# Patient Record
Sex: FEMALE | Race: WHITE | NOT HISPANIC OR LATINO | Employment: FULL TIME | URBAN - METROPOLITAN AREA
[De-identification: names, ages, dates, MRNs, and addresses within clinical notes are randomized per-mention and may not be internally consistent; named-entity substitution may affect disease eponyms.]

---

## 2024-04-04 ENCOUNTER — APPOINTMENT (OUTPATIENT)
Dept: PRIMARY CARE | Facility: CLINIC | Age: 59
End: 2024-04-04
Payer: COMMERCIAL

## 2024-04-05 ENCOUNTER — APPOINTMENT (OUTPATIENT)
Dept: PRIMARY CARE | Facility: CLINIC | Age: 59
End: 2024-04-05
Payer: COMMERCIAL

## 2024-04-12 ENCOUNTER — APPOINTMENT (OUTPATIENT)
Dept: PRIMARY CARE | Facility: CLINIC | Age: 59
End: 2024-04-12
Payer: COMMERCIAL

## 2024-08-16 ENCOUNTER — LAB (OUTPATIENT)
Dept: LAB | Facility: LAB | Age: 59
End: 2024-08-16
Payer: COMMERCIAL

## 2024-08-16 LAB — COTININE UR QL SCN: NEGATIVE

## 2024-08-19 ENCOUNTER — APPOINTMENT (OUTPATIENT)
Dept: OTOLARYNGOLOGY | Facility: CLINIC | Age: 59
End: 2024-08-19
Payer: COMMERCIAL

## 2025-07-14 ENCOUNTER — ANCILLARY PROCEDURE (OUTPATIENT)
Dept: URGENT CARE | Age: 60
End: 2025-07-14
Payer: COMMERCIAL

## 2025-07-14 ENCOUNTER — OFFICE VISIT (OUTPATIENT)
Dept: URGENT CARE | Age: 60
End: 2025-07-14
Payer: COMMERCIAL

## 2025-07-14 VITALS
DIASTOLIC BLOOD PRESSURE: 95 MMHG | OXYGEN SATURATION: 97 % | RESPIRATION RATE: 17 BRPM | WEIGHT: 159 LBS | HEART RATE: 94 BPM | BODY MASS INDEX: 29.08 KG/M2 | SYSTOLIC BLOOD PRESSURE: 152 MMHG | TEMPERATURE: 97.4 F

## 2025-07-14 DIAGNOSIS — S92.515A CLOSED NONDISPLACED FRACTURE OF PROXIMAL PHALANX OF LESSER TOE OF LEFT FOOT, INITIAL ENCOUNTER: ICD-10-CM

## 2025-07-14 DIAGNOSIS — S99.922A FOOT INJURY, LEFT, INITIAL ENCOUNTER: Primary | ICD-10-CM

## 2025-07-14 DIAGNOSIS — S99.922A FOOT INJURY, LEFT, INITIAL ENCOUNTER: ICD-10-CM

## 2025-07-14 PROCEDURE — 73630 X-RAY EXAM OF FOOT: CPT | Mod: LEFT SIDE | Performed by: REGISTERED NURSE

## 2025-07-14 ASSESSMENT — ENCOUNTER SYMPTOMS
TINGLING: 0
ARTHRALGIAS: 1
NUMBNESS: 0
LOSS OF SENSATION: 0

## 2025-07-14 NOTE — PATIENT INSTRUCTIONS
Wear post op shoe for the next 4-6 weeks  Tylenol/Ibuprofen  If symptoms persist or worsen, follow up with pcp or ortho

## 2025-07-14 NOTE — PROGRESS NOTES
Subjective   Patient ID: Akiko Anthony is a 59 y.o. female. They present today with a chief complaint of Foot Injury (Slipped yesterday, and injured left foot, bruising to dorsal side of foot, and bruising to toes.//No Other injuries.//No OTC meds today.).    History of Present Illness    History provided by:  Patient  Foot Injury   The incident occurred 12 to 24 hours ago. The incident occurred at home. The injury mechanism was a fall. The pain is present in the left foot. The quality of the pain is described as aching. The pain is at a severity of 7/10. The pain is moderate. The pain has been Constant since onset. Pertinent negatives include no loss of sensation, numbness or tingling. The symptoms are aggravated by weight bearing, palpation and movement. She has tried elevation and acetaminophen for the symptoms. The treatment provided mild relief.       Past Medical History  Allergies as of 07/14/2025 - Reviewed 07/14/2025   Allergen Reaction Noted    Penicillins Hives and Unknown 03/13/2012       Prescriptions Prior to Admission[1]     Medical History[2]    Surgical History[3]     reports that she has never smoked. She has never used smokeless tobacco. She reports that she does not currently use alcohol. She reports that she does not use drugs.    Review of Systems  Review of Systems   Musculoskeletal:  Positive for arthralgias and gait problem.   Neurological:  Negative for tingling and numbness.   All other systems reviewed and are negative.                                 Objective    Vitals:    07/14/25 1130   BP: (!) 152/95   BP Location: Left arm   Patient Position: Sitting   BP Cuff Size: Adult long   Pulse: 94   Resp: 17   Temp: 36.3 °C (97.4 °F)   TempSrc: Oral   SpO2: 97%   Weight: 72.1 kg (159 lb)     No LMP recorded. Patient is postmenopausal.    Physical Exam  Vitals and nursing note reviewed.   Musculoskeletal:      Left foot: Decreased range of motion. Swelling and tenderness present.         Feet:          Procedures    Point of Care Test & Imaging Results from this visit  No results found for this visit on 07/14/25.   Imaging  XR foot left 3+ views  Result Date: 7/14/2025  Age-indeterminate nondisplaced fracture of the 3rd and 4th proximal phalanx shaft.     MACRO: None   Signed by: Hernán Duong 7/14/2025 12:25 PM Dictation workstation:   [id number]      Cardiology, Vascular, and Other Imaging  No other imaging results found for the past 2 days      Diagnostic study results (if any) were reviewed by Crystal L Severino, APRN-CNP.    Assessment/Plan   Allergies, medications, history, and pertinent labs/EKGs/Imaging reviewed by Crystal L Severino, APRN-CNP.     Medical Decision Making  59 yr old presents stating yesterday while trying to stand on the running board of her vehicle, she slipped and injured the dorsal side of her left foot and states she bent all of her toes backward.  [facility] as above, left foot xray obtained; fractures to 3rd and 4th toes as above.  Post op shoe is given with RICE protocol.  At time of discharge patient was clinically well-appearing and HDS for outpatient management. The patient and/or family was educated regarding diagnosis, supportive care, OTC and Rx medications. The patient and/or family was given the opportunity to ask questions prior to discharge.  They verbalized understanding of my discussion of the plans for treatment, expected course, indications to return to  or seek further evaluation in ED, and the need for timely follow up as directed.   They were provided with a work/school excuse if requested.      Orders and Diagnoses  Diagnoses and all orders for this visit:  Foot injury, left, initial encounter  -     XR foot left 3+ views; Future  Closed nondisplaced fracture of proximal phalanx of lesser toe of left foot, initial encounter  -     Post-op shoe  RICE  Tylenol/Ibuprofen  If symptoms persist or worsen, follow up with pcp or ortho      Medical Admin  Record      Patient disposition: Home    Electronically signed by Crystal L Severino, APRN-CNP  12:36 PM           [1] (Not in a hospital admission)   [2] No past medical history on file.  [3] No past surgical history on file.